# Patient Record
Sex: MALE | Race: WHITE | URBAN - METROPOLITAN AREA
[De-identification: names, ages, dates, MRNs, and addresses within clinical notes are randomized per-mention and may not be internally consistent; named-entity substitution may affect disease eponyms.]

---

## 2017-10-19 PROBLEM — 13644009 HYPERCHOLESTEROLEMIA: Status: ACTIVE | Noted: 2017-10-19

## 2017-10-19 PROBLEM — 14304000 THYROID DISORDER: Status: ACTIVE | Noted: 2017-10-19

## 2017-10-19 PROBLEM — 313436004 TYPE II DIABETES MELLITUS WITHOUT COMPLICATION: Status: ACTIVE | Noted: 2017-10-19

## 2017-10-19 PROBLEM — 38341003 HYPERTENSION: Status: ACTIVE | Noted: 2017-10-19

## 2017-10-19 PROBLEM — 70153002 HEMORRHOIDS: Status: ACTIVE | Noted: 2017-10-19

## 2017-10-19 PROBLEM — 428283002 HISTORY OF POLYP OF COLON: Status: ACTIVE | Noted: 2017-10-19

## 2017-10-20 PROBLEM — 73430006 SLEEP APNEA: Status: ACTIVE | Noted: 2017-10-20

## 2021-08-28 ENCOUNTER — TELEPHONE ENCOUNTER (OUTPATIENT)
Dept: URBAN - METROPOLITAN AREA CLINIC 13 | Facility: CLINIC | Age: 76
End: 2021-08-28

## 2021-08-29 ENCOUNTER — TELEPHONE ENCOUNTER (OUTPATIENT)
Dept: URBAN - METROPOLITAN AREA CLINIC 13 | Facility: CLINIC | Age: 76
End: 2021-08-29

## 2021-08-29 RX ORDER — ASPIRIN 325 MG/1
TABLET ORAL
Status: ACTIVE | COMMUNITY

## 2021-08-29 RX ORDER — MULTIVIT-MIN/FOLIC/VIT K/LYCOP 400-300MCG
TABLET ORAL
Status: ACTIVE | COMMUNITY

## 2021-08-29 RX ORDER — GLIMEPIRIDE 4 MG/1
TABLET ORAL
Status: ACTIVE | COMMUNITY

## 2021-08-29 RX ORDER — NITROGLYCERIN 0.4 MG/1
TABLET SUBLINGUAL
Status: ACTIVE | COMMUNITY

## 2021-08-29 RX ORDER — ROSUVASTATIN CALCIUM 40 MG/1
TABLET, FILM COATED ORAL
Status: ACTIVE | COMMUNITY

## 2021-08-29 RX ORDER — SITAGLIPTIN PHOSPHATE 100 MG
TABLET ORAL
Status: ACTIVE | COMMUNITY

## 2021-08-29 RX ORDER — HUMAN INSULIN 100 [IU]/ML
INJECTION, SUSPENSION SUBCUTANEOUS
Status: ACTIVE | COMMUNITY

## 2021-08-29 RX ORDER — PHENOL 1.4 %
AEROSOL, SPRAY (ML) MUCOUS MEMBRANE
Status: ACTIVE | COMMUNITY

## 2021-08-29 RX ORDER — LEVOTHYROXINE SODIUM 50 UG/1
TABLET ORAL
Status: ACTIVE | COMMUNITY

## 2021-08-29 RX ORDER — AMLODIPINE BESYLATE 5 MG/1
TABLET ORAL
Status: ACTIVE | COMMUNITY

## 2021-08-29 RX ORDER — ATENOLOL 25 MG/1
TABLET ORAL
Status: ACTIVE | COMMUNITY

## 2021-08-29 RX ORDER — LOSARTAN POTASSIUM 100 MG/1
TABLET, FILM COATED ORAL
Status: ACTIVE | COMMUNITY

## 2021-08-29 RX ORDER — FUROSEMIDE 20 MG/1
TABLET ORAL
Status: ACTIVE | COMMUNITY

## 2024-08-07 ENCOUNTER — TELEPHONE ENCOUNTER (OUTPATIENT)
Dept: URBAN - METROPOLITAN AREA CLINIC 46 | Facility: CLINIC | Age: 79
End: 2024-08-07

## 2024-08-15 PROBLEM — 428283002: Status: ACTIVE | Noted: 2024-08-15

## 2024-08-16 ENCOUNTER — DASHBOARD ENCOUNTERS (OUTPATIENT)
Age: 79
End: 2024-08-16

## 2024-08-16 ENCOUNTER — OFFICE VISIT (OUTPATIENT)
Dept: URBAN - METROPOLITAN AREA CLINIC 46 | Facility: CLINIC | Age: 79
End: 2024-08-16
Payer: MEDICARE

## 2024-08-16 VITALS
WEIGHT: 271.2 LBS | HEART RATE: 56 BPM | BODY MASS INDEX: 36.73 KG/M2 | DIASTOLIC BLOOD PRESSURE: 73 MMHG | TEMPERATURE: 97.2 F | SYSTOLIC BLOOD PRESSURE: 149 MMHG | HEIGHT: 72 IN

## 2024-08-16 DIAGNOSIS — Z86.010 HX OF COLONIC POLYPS: ICD-10-CM

## 2024-08-16 DIAGNOSIS — R19.4 CHANGE IN BOWEL HABITS: ICD-10-CM

## 2024-08-16 PROCEDURE — 99204 OFFICE O/P NEW MOD 45 MIN: CPT

## 2024-08-16 RX ORDER — ATENOLOL 25 MG/1
1 TABLET TABLET ORAL TWICE A DAY
Status: ACTIVE | COMMUNITY

## 2024-08-16 RX ORDER — AMLODIPINE BESYLATE 2.5 MG/1
1 TABLET TABLET ORAL ONCE A DAY
Status: ACTIVE | COMMUNITY

## 2024-08-16 RX ORDER — DONEPEZIL HYDROCHLORIDE 10 MG/1
1 TABLET AT BEDTIME TABLET, FILM COATED ORAL ONCE A DAY
Status: ACTIVE | COMMUNITY

## 2024-08-16 RX ORDER — ZINC GLUCONATE 50 MG
1 TABLET TABLET ORAL ONCE A DAY
Status: ACTIVE | COMMUNITY

## 2024-08-16 RX ORDER — METFORMIN HYDROCHLORIDE 1000 MG/1
1 TABLET WITH A MEAL TABLET, FILM COATED ORAL TWICE A DAY
Status: ACTIVE | COMMUNITY

## 2024-08-16 RX ORDER — CLINDAMYCIN HYDROCHLORIDE 300 MG/1
1 CAPSULE CAPSULE ORAL
Status: ACTIVE | COMMUNITY

## 2024-08-16 RX ORDER — FUROSEMIDE 20 MG/1
1 TABLET TABLET ORAL ONCE A DAY
Status: ACTIVE | COMMUNITY

## 2024-08-16 RX ORDER — PIOGLITAZONE 30 MG/1
1 TABLET TABLET ORAL ONCE A DAY
Status: ACTIVE | COMMUNITY

## 2024-08-16 RX ORDER — TELMISARTAN 80 MG/1
1 TABLET TABLET ORAL ONCE A DAY
Status: ACTIVE | COMMUNITY

## 2024-08-16 RX ORDER — SERTRALINE 50 MG/1
1 TABLET TABLET, FILM COATED ORAL ONCE A DAY
Status: ACTIVE | COMMUNITY

## 2024-08-16 RX ORDER — CLOPIDOGREL BISULFATE 75 MG/1
1 TABLET TABLET ORAL ONCE A DAY
Status: ACTIVE | COMMUNITY

## 2024-08-16 RX ORDER — MULTIVIT-MIN/FOLIC/VIT K/LYCOP 400-300MCG
AS DIRECTED TABLET ORAL
Status: ACTIVE | COMMUNITY

## 2024-08-16 RX ORDER — ROSUVASTATIN CALCIUM 40 MG/1
1 TABLET TABLET, FILM COATED ORAL ONCE A DAY
Status: ACTIVE | COMMUNITY

## 2024-08-16 RX ORDER — RANOLAZINE 500 MG/1
1 TABLET TABLET, EXTENDED RELEASE ORAL TWICE A DAY
Status: ACTIVE | COMMUNITY

## 2024-08-16 RX ORDER — UBIDECARENONE 100 MG
AS DIRECTED CAPSULE ORAL
Status: ACTIVE | COMMUNITY

## 2024-08-16 RX ORDER — GLIMEPIRIDE 4 MG/1
1 TABLET WITH BREAKFAST OR THE FIRST MAIN MEAL OF THE DAY TABLET ORAL ONCE A DAY
Status: ACTIVE | COMMUNITY

## 2024-08-16 RX ORDER — LEVOTHYROXINE SODIUM 50 UG/1
1 TABLET IN THE MORNING ON AN EMPTY STOMACH TABLET ORAL ONCE A DAY
Status: ACTIVE | COMMUNITY

## 2024-08-16 RX ORDER — LACTOBACILLUS RHAMNOSUS GG 10B CELL
AS DIRECTED CAPSULE ORAL
Status: ACTIVE | COMMUNITY

## 2024-08-16 RX ORDER — ASPIRIN 81 MG/1
1 TABLET TABLET, COATED ORAL ONCE A DAY
Status: ACTIVE | COMMUNITY

## 2024-08-16 NOTE — HPI-TODAY'S VISIT:
78-year-old male presents for evaluation of intermittent diarrhea x 4 months, which is now resolved. He was having diarrhea 1x/week, with multiple loose BMs daily. He had some abd bloating during episodes of diarrhea, but denies pain. No melena/rectal bleeding, fever. He lost a couple lbs during episodes of diarrhea, but has now regained the weight. His symptoms have improved after initiating culturelle probiotic, and he has had no loose stools over the past 2 weeks. He has now has formed BM 1x/day. Last colonoscopy 1/12/2018 with diverticulosis, mild internal hemorrhoids, removal of adenomatous polyps; no further screening colonoscopy recommended due to age. He has his gallbladder, and no known hx pancreatitis or bowel surgery.  Labs 7/30/2024 show elevated A1c of 8.0, normal CBC, CMP, iron studies.  TSH was normal. Patient had left heart cath 11/13/2023 for severe CAD, status post shockwave balloon angioplasty and stenting. Discharged home on DAPT, which he continues.  Followed by Dr. Stevenson.  He also had CAD s/p bypass in 2002.